# Patient Record
Sex: FEMALE | Race: WHITE
[De-identification: names, ages, dates, MRNs, and addresses within clinical notes are randomized per-mention and may not be internally consistent; named-entity substitution may affect disease eponyms.]

---

## 2018-03-18 ENCOUNTER — HOSPITAL ENCOUNTER (INPATIENT)
Dept: HOSPITAL 5 - TRG | Age: 33
LOS: 2 days | Discharge: HOME | End: 2018-03-20
Attending: OBSTETRICS & GYNECOLOGY | Admitting: OBSTETRICS & GYNECOLOGY
Payer: MEDICAID

## 2018-03-18 DIAGNOSIS — Z3A.38: ICD-10-CM

## 2018-03-18 DIAGNOSIS — D64.9: ICD-10-CM

## 2018-03-18 DIAGNOSIS — J45.909: ICD-10-CM

## 2018-03-18 LAB
HCT VFR BLD CALC: 37.7 % (ref 30.3–42.9)
HGB BLD-MCNC: 12.2 GM/DL (ref 10.1–14.3)
MCH RBC QN AUTO: 29 PG (ref 28–32)
MCHC RBC AUTO-ENTMCNC: 33 % (ref 30–34)
MCV RBC AUTO: 88 FL (ref 79–97)
PLATELET # BLD: 183 K/MM3 (ref 140–440)
RBC # BLD AUTO: 4.3 M/MM3 (ref 3.65–5.03)

## 2018-03-18 PROCEDURE — 00HU33Z INSERTION OF INFUSION DEVICE INTO SPINAL CANAL, PERCUTANEOUS APPROACH: ICD-10-PCS

## 2018-03-18 PROCEDURE — 86901 BLOOD TYPING SEROLOGIC RH(D): CPT

## 2018-03-18 PROCEDURE — 85014 HEMATOCRIT: CPT

## 2018-03-18 PROCEDURE — 85027 COMPLETE CBC AUTOMATED: CPT

## 2018-03-18 PROCEDURE — 86850 RBC ANTIBODY SCREEN: CPT

## 2018-03-18 PROCEDURE — 90686 IIV4 VACC NO PRSV 0.5 ML IM: CPT

## 2018-03-18 PROCEDURE — G0008 ADMIN INFLUENZA VIRUS VAC: HCPCS

## 2018-03-18 PROCEDURE — 99211 OFF/OP EST MAY X REQ PHY/QHP: CPT

## 2018-03-18 PROCEDURE — 59025 FETAL NON-STRESS TEST: CPT

## 2018-03-18 PROCEDURE — 86900 BLOOD TYPING SEROLOGIC ABO: CPT

## 2018-03-18 PROCEDURE — G0463 HOSPITAL OUTPT CLINIC VISIT: HCPCS

## 2018-03-18 PROCEDURE — A6250 SKIN SEAL PROTECT MOISTURIZR: HCPCS

## 2018-03-18 PROCEDURE — 85018 HEMOGLOBIN: CPT

## 2018-03-18 PROCEDURE — 90715 TDAP VACCINE 7 YRS/> IM: CPT

## 2018-03-18 PROCEDURE — 90471 IMMUNIZATION ADMIN: CPT

## 2018-03-18 PROCEDURE — 3E0R3BZ INTRODUCTION OF ANESTHETIC AGENT INTO SPINAL CANAL, PERCUTANEOUS APPROACH: ICD-10-PCS

## 2018-03-18 PROCEDURE — 36415 COLL VENOUS BLD VENIPUNCTURE: CPT

## 2018-03-18 NOTE — HISTORY AND PHYSICAL REPORT
History of Present Illness


Date of examination: 18


Date of admission: 


18 11:20





Chief complaint: 


contractions





History of present illness: 


31y/o  @ 38+2 weeks presents in active labor. She denies leakage of 

fluid. The patient transferred her care @ 31 weeks from [place]. She reports 

conceiving on an IUD. GBS negative. 








Past History


Past Medical History: no pertinent history


Past Surgical History: no surgical history


GYN History: hepatitis C


Social history: 





- Obstetrical History


Expected Date of Delivery: 18


Actual Gestation: 38 Week(s) 2 Day(s) 


: 6


Para: 4


Hx # Term Pregnancies: 4


Number of  Pregnancies: 0


Spontaneous Abortions: 1


Induced : 0


Number of Living Children: 4





Medications and Allergies


 Allergies











Allergy/AdvReac Type Severity Reaction Status Date / Time


 


No Known Allergies Allergy   Verified 18 07:35











 Home Medications











 Medication  Instructions  Recorded  Confirmed  Last Taken  Type


 


Prenatal Vits96/Iron Fum/Folic 1 tab PO DAILY 14 09:00 

History





[Prenatal Tablet]    1 











Active Meds: 


Active Medications





Influenza Virus Vaccine Quadrival (Fluarix Quad [id number](36 Mos+)  0.5 ml IM 

.ONCE ONE


   Stop: 18 12:01











Review of Systems


All systems: negative


Genitourinary: pelvic pain, contractions





- Vital Signs


Vital signs: 


 Vital Signs











Pulse BP


 


 105 H  111/68 


 


 18 07:58  18 07:58








 











Temp Pulse Resp BP Pulse Ox


 


 98.4 F   105 H  18   111/68    


 


 18 11:32  18 07:58  18 11:32  18 07:58   














- Physical Exam


Breasts: Positive: deferred


Cardiovascular: Regular rate


Lungs: Positive: Clear to auscultation


Abdomen: Positive: normal appearance





Results


All other labs normal.








Assessment and Plan





- Patient Problems


(1) Active labor at term


Current Visit: No   Status: Acute   


Plan to address problem: 


admit to L&D

## 2018-03-18 NOTE — PROCEDURE NOTE
OB Delivery Note





- Delivery


Date of Delivery: 18


Surgeon: FRED SHIPLEY


Estimated blood loss: 100cc





- Vaginal


Delivery presentation: vertex


Delivery position: OA


Intrapartum events: none


Delivery augmentation: pitocin


Delivery monitor: external FHT


Route of delivery: 


Delivery placenta: spontaneous


Delivery cord: nuchal cord, 3 umbilical vessels


Episiotomy: none


Delivery laceration: none


Anesthesia: epidural


Delivery comments: 


Patient progressed to C/C/+1 and pushed to deliver a liveborn male infant with 

apgars of 8/9 and weight of 8lbs 8oz. After delivery of the head a nuchal cord 

x 1 was manually reduced. The anterior shoulder delivered. The infant was bulb 

suctioned and placed on the patient's abdomen after the cord was clamped and 

cut. The placenta delivered spontaneous intact with a 3VC. No lacerations. EBL 

100ml








- Infant


  ** A


Apgar at 1 minute: 8 (weight 8lbs 8oz)


Apgar at 5 minutes: 9


Infant Gender: Male (weight)

## 2018-03-19 LAB
HCT VFR BLD CALC: 33.8 % (ref 30.3–42.9)
HGB BLD-MCNC: 11 GM/DL (ref 10.1–14.3)

## 2018-03-19 RX ADMIN — IBUPROFEN SCH MG: 600 TABLET, FILM COATED ORAL at 18:17

## 2018-03-19 RX ADMIN — IBUPROFEN SCH MG: 600 TABLET, FILM COATED ORAL at 05:47

## 2018-03-19 RX ADMIN — IBUPROFEN SCH: 600 TABLET, FILM COATED ORAL at 22:00

## 2018-03-19 RX ADMIN — HYDROCODONE BITARTRATE AND ACETAMINOPHEN PRN EACH: 5; 325 TABLET ORAL at 14:09

## 2018-03-19 RX ADMIN — IBUPROFEN SCH MG: 600 TABLET, FILM COATED ORAL at 00:18

## 2018-03-19 RX ADMIN — HYDROCODONE BITARTRATE AND ACETAMINOPHEN PRN EACH: 5; 325 TABLET ORAL at 22:41

## 2018-03-19 RX ADMIN — IBUPROFEN SCH MG: 600 TABLET, FILM COATED ORAL at 12:05

## 2018-03-19 RX ADMIN — HYDROCODONE BITARTRATE AND ACETAMINOPHEN PRN EACH: 5; 325 TABLET ORAL at 08:17

## 2018-03-19 NOTE — DISCHARGE SUMMARY
Providers





- Providers


Date of Admission: 


18 11:20





Date of discharge: 18


Attending physician: 


FRED SHIPLEY





Primary care physician: 


FRED SHIPLEY








Hospitalization


Reason for admission: active labor


Delivery: 


Episiotomy: none


Laceration: none


Other postpartum procedures: none


Postpartum complications: none


[place] baby: male


Condition at discharge: Good


Disposition: DC-01 TO HOME OR SELFCARE





Plan





- Provider Discharge Summary


Activity: routine, no sex for 6 weeks, no strenuous exercise


Diet: routine


Instructions: routine


Additional instructions: 


[]  Smoking cessation referral if applicable(refer to patient education folder 

for contact #)


[]  Refer to [facility]'s [facility] Booklet








Call your doctor immediately for:


* Fever > 100.5


* Heavy vaginal bleeding ( >1 pad per hour)


* Severe persistent headache


* Shortness of breath


* Reddened, hot, painful area to leg or breast


* Drainage or odor from incision.





* Keep incision clean and dry at all times and follow doctor's instructions 

regarding bathing/showering











- Follow up plan


Follow up: 


FRED SHIPLEY MD [Primary Care Provider] - 18

## 2018-03-19 NOTE — PROGRESS NOTE
Assessment and Plan





A/P PPD#1  


      doing well


      VSS


      desires tubal on outpatient


      h/h 12.2/37.7--11/33.8


      O+ no rhogam indicated 





Subjective





- Subjective


Date of service: 18


Principal diagnosis: s/p 


Patient reports: appetite normal, voiding normally, pain well controlled, flatus

, ambulating normally


: doing well, nursing well, bottle feeding





Objective





- Vital Signs


Latest vital signs: 


 Vital Signs











  Temp Pulse Resp BP BP Pulse Ox


 


 18 08:17    20   


 


 18 06:47    18   


 


 18 05:47    20   


 


 18 04:15  98.6 F  72  16   117/72 


 


 18 01:18    16   


 


 18 00:18    18   


 


 18 00:00  98.6 F  89  16   100/69 


 


 18 20:00  98.6 F  79  16   114/78 


 


 18 19:04  98.8 F  87  18   119/75 


 


 18 16:59   86   111/70  


 


 18 16:44   111 H   104/62  


 


 18 16:29   90   106/57  


 


 18 16:14   98 H   108/64  


 


 18 15:59   99 H   103/59  


 


 18 15:44   107 H   112/67  


 


 18 15:31   150 H   102/63  


 


 18 15:15   98 H   112/68  


 


 18 15:12   110 H     93


 


 18 15:11   99 H     100


 


 18 15:06   103 H     100


 


 18 15:01   90     100


 


 18 14:59   81   109/64  


 


 18 14:56   94 H     100


 


 18 14:51   84     100


 


 18 14:46   91 H     100


 


 18 14:44   91 H   112/60  


 


 18 14:41   94 H     100


 


 18 14:36   102 H     100


 


 18 14:31   95 H     100


 


 18 14:29   92 H   112/68  


 


 18 14:26   91 H     100


 


 18 14:21   90     100


 


 18 14:16   95 H     100


 


 03/18/18 14:14   85   109/68  


 


 03/18/18 14:11   98 H     99


 


 03/18/18 14:06   95 H     98


 


 03/18/18 14:01   108 H     98


 


 03/18/18 13:59   108 H   103/73  


 


 03/18/18 13:56   85     99


 


 03/18/18 13:51   118 H     98


 


 03/18/18 13:46   116 H     98


 


 03/18/18 13:44   109 H   104/57  


 


 03/18/18 13:41   107 H     99


 


 03/18/18 13:36   102 H     97


 


 03/18/18 13:31   102 H     97


 


 03/18/18 13:29   102 H   108/62  


 


 03/18/18 13:26   86     97


 


 03/18/18 13:21   96 H     98


 


 03/18/18 13:16   100 H   97/62   98


 


 03/18/18 13:11   102 H     98


 


 03/18/18 13:06   99 H     98


 


 03/18/18 13:01   97 H     98


 


 03/18/18 12:58   109 H   121/62  


 


 03/18/18 12:56   107 H   120/60   98


 


 03/18/18 12:54   110 H   125/67  


 


 03/18/18 12:52   110 H   122/70  


 


 03/18/18 12:51   102 H     98


 


 03/18/18 12:50   106 H   124/68  


 


 03/18/18 12:48   106 H   124/73  


 


 03/18/18 12:46   107 H   116/66   98


 


 03/18/18 12:44   117 H   111/64  


 


 03/18/18 12:42   105 H   116/67  


 


 03/18/18 12:41   102 H     98


 


 03/18/18 12:40   104 H   120/71  


 


 03/18/18 12:38   108 H   119/72  


 


 03/18/18 12:36   96 H   126/79   99


 


 03/18/18 12:34   100 H   123/76  


 


 03/18/18 12:32   105 H   135/75  


 


 03/18/18 12:31   110 H     98


 


 03/18/18 12:15   110 H   121/64  


 


 03/18/18 12:01   103 H   120/59  


 


 03/18/18 11:47   96 H   106/61  


 


 03/18/18 11:32  98.4 F   18   








 Intake and Output











 03/18/18 03/19/18 03/19/18





 23:59 07:59 15:59


 


Intake Total 300 200 


 


Output Total 700  


 


Balance -400 200 


 


Intake:   


 


  Oral  200 


 


  Intake, Free Water 300  


 


Output:   


 


  Urine 700  


 


    Void 700  


 


Other:   


 


  Total, Intake Amount  200 


 


  Total, Output Amount 200  


 


  # Voids   


 


    Void 1  














- Exam


Breasts: Present: normal


Cardiovascular: Present: Regular rate, Normal S1


Lungs: Present: Clear to auscultation


Abdomen: Present: normal appearance, soft, normal bowel sounds.  Absent: 

distention, tenderness, guarding


Vulva: both: normal


Uterus: Present: normal, firm, fundal height below umbilicus.  Absent: bogginess

, tenderness


Extremities: Present: normal


Deep Tendon Reflex Grade: Normal +2





- Labs


Labs: 


 Abnormal lab results











  18 Range/Units





  11:00 


 


WBC  13.4 H  (4.5-11.0)  K/mm3


 


RDW  17.8 H  (13.2-15.2)  %

## 2018-03-20 VITALS — DIASTOLIC BLOOD PRESSURE: 62 MMHG | SYSTOLIC BLOOD PRESSURE: 104 MMHG

## 2018-03-20 RX ADMIN — IBUPROFEN SCH MG: 600 TABLET, FILM COATED ORAL at 06:28

## 2018-03-20 RX ADMIN — IBUPROFEN SCH MG: 600 TABLET, FILM COATED ORAL at 00:09

## 2018-05-02 ENCOUNTER — HOSPITAL ENCOUNTER (OUTPATIENT)
Dept: HOSPITAL 5 - OR | Age: 33
Discharge: HOME | End: 2018-05-02
Attending: OBSTETRICS & GYNECOLOGY
Payer: MEDICAID

## 2018-05-02 VITALS — DIASTOLIC BLOOD PRESSURE: 67 MMHG | SYSTOLIC BLOOD PRESSURE: 121 MMHG

## 2018-05-02 DIAGNOSIS — Z30.2: Primary | ICD-10-CM

## 2018-05-02 DIAGNOSIS — K21.9: ICD-10-CM

## 2018-05-02 DIAGNOSIS — J45.909: ICD-10-CM

## 2018-05-02 DIAGNOSIS — B19.20: ICD-10-CM

## 2018-05-02 DIAGNOSIS — N83.01: ICD-10-CM

## 2018-05-02 PROCEDURE — 88112 CYTOPATH CELL ENHANCE TECH: CPT

## 2018-05-02 PROCEDURE — 58671 LAPAROSCOPY TUBAL BLOCK: CPT

## 2018-05-02 PROCEDURE — 81025 URINE PREGNANCY TEST: CPT

## 2018-05-02 RX ADMIN — HYDROMORPHONE HYDROCHLORIDE PRN MG: 1 INJECTION, SOLUTION INTRAMUSCULAR; INTRAVENOUS; SUBCUTANEOUS at 08:45

## 2018-05-02 RX ADMIN — HYDROMORPHONE HYDROCHLORIDE PRN MG: 1 INJECTION, SOLUTION INTRAMUSCULAR; INTRAVENOUS; SUBCUTANEOUS at 08:55

## 2018-05-02 NOTE — SHORT STAY SUMMARY
Short Stay Documentation


Date of service: 18


Narrative H&P: 


34y/o  with undesired fertility. She has been counseled on other 

contraceptive options and has elected for permanent sterilization. 








- History


Principal diagnosis: Undesired fertility


Past Medical History: other (Hepatitis C)


Past Surgical History: No surgical history


Social history: 





- Allergies and Medications


Current Medications: 


 Allergies





No Known Allergies Allergy (Verified 18 15:36)


 





 Home Medications











 Medication  Instructions  Recorded  Confirmed  Last Taken  Type


 


No Known Home Medications [No  18 Unknown History





Reported Home Medications]     








Active Medications





Hydromorphone HCl (Dilaudid)  0.5 mg IV Q10MIN PRN


   PRN Reason: Pain , Severe (7-10)


Lactated Ringer's (Lactated Ringers)  1,000 mls @ 100 mls/hr IV AS DIRECT LEONEL


   Last Admin: 18 06:15 Dose:  100 mls/hr


Midazolam HCl (Versed)  2 mg IV PREOP NR


   Stop: 18 23:59











- Physical exam


General appearance: no acute distress


Integumentary: no rash


HEENT: Atraumatic


Lungs: Clear to auscultation


Breasts: deferred


Heart: Regular rate


Gastrointestinal: normal


Female Genitourinary: deferred


Rectal Exam: deferred





- Brief post op/procedure progress note


Date of procedure: 18


Pre-op diagnosis: undesired fertility


Post-op diagnosis: same


Procedure: 


Laparoscopic bilateral tubal ligation


Evacuation of ovarian cyst fluid





Anesthesia: GETA


Surgeon: FRED SHIPLEY


Estimated blood loss: none


Pathology: list (ovarian cyst fluid)


Specimen disposition: to lab


Condition: stable





- Hospital course


Hospital course: 


The patient was admitted the day of surgery and underwent a bilateral tubal 

ligation.  Please see operative note for details of surgery.  Postoperative 

course was uneventful.








- Disposition


Condition at discharge: Good


Disposition: DC-01 TO HOME OR SELFCARE





Short Stay Discharge Plan


Activity: other (pelvic rest for 1 week)


Diet: regular


Additional Instructions: 


Follow-up is not required


Follow-up as needed


Prescriptions: 


Ibuprofen [Motrin] 800 mg PO Q8HR PRN #60 tablet


 PRN Reason: Pain


oxyCODONE /ACETAMINOPHEN [Percocet 5/325] 1 tab PO Q6HR PRN #30 tablet


 PRN Reason: Pain

## 2018-05-02 NOTE — ANESTHESIA CONSULTATION
Anesthesia Consult and Med Hx


Date of service: 05/02/18





- Airway


Anesthetic Teeth Evaluation: Good


ROM Head & Neck: Adequate


Mental/Hyoid Distance: Adequate (3FB)


Mallampati Class: Class II


Intubation Access Assessment: Probably Good





- Pulmonary Exam


CTA: Yes





- Cardiac Exam


Cardiac Exam: RRR (grade 1 murmur)





- Pre-Operative Health Status


ASA Pre-Surgery Classification: ASA2


Proposed Anesthetic Plan: General





- Pulmonary


Hx Smoking: No


Hx Asthma: Yes (Not treated - not recently active)


COPD: No


Hx Pneumonia: No





- Cardiovascular System


Hx Hypertension: No


Hx Heart Murmur: Yes





- Central Nervous System


Hx Seizures: No


Hx Psychiatric Problems: No





- Gastrointestinal


Hx Gastroesophageal Reflux Disease: No (periodic heartburn treated with OTC 

Prevacid PRN)





- Endocrine


Hx Hypothyroidism: No


Hx Hyperthyroidism: No





- Hematic


Hx Anemia: Yes


Hx Sickle Cell Disease: No





- Other Systems


Hx Alcohol Use: No


Hx Cancer: No

## 2018-05-02 NOTE — OPERATIVE REPORT
Operative Report


Operative Report: 


Date of surgery: 2018





Preoperative diagnosis: Unwanted fertility





Postoperative diagnosis: Same as above





Procedure: Laparoscopic bilateral tubal ligation with Filshie clips; aspiration 

of ovarian cyst





Surgeon: Jada León M.D.





Anesthesia: General endotracheal anesthesia





Estimated blood loss: Minimal





Findings: Normal uterus and tubes bilaterally.  Right simple ovarian cyst and 

normal left ovary





Indication: 33-year-old  015 with undesired fertility.  The patient 

elected for permanent sterilization.





Procedure: The patient was taken to the operating room and given general 

endotracheal anesthesia without complication.  The patient is prepped and 

draped in a normal sterile fashion.  A bivalve speculum was placed in the 

patient's vagina and a single-tooth tenaculum was placed on  the anterior lip 

of the cervix .A uterine acorn manipulato rwas placed,  and the bivalve 

speculum was then removed. Attention was then turned to the patient's abdomen 

where a 5 mm infraumbilical skin incision was then made.  A Veress needle was 

placed and peritoneal entry was verified water-filled syringe.  Insufflation of 

the peritoneal cavity was performed with CO2 gas.  A 5 mm trocar was placed and 

the laparoscope was then inserted.  The patient was then placed in 

Trendelenburg.  A 7 mm suprapubic skin incision was then made.  Under direct 

visualization a 7 mm trocar was then placed.  General survey of the patient's 

abdomen revealedefault value. The fallopian tube was then followed out to the 

fimbriated end.  A Filshie clip was placed, on the ampullary portion of the 

tube.  This was performed on the contralateral side as well.  There were 

findings of a right ovarian simple cyst.  A needle was injected into the 

ovarian cyst with aspiration of clear jose fluid.  The fluid was sent to 

pathology.  The 7 mm trocar was then removed.  The pneumoperitoneum was then 

released.  The 5 mm trocar laparoscope was then removed.  The skin incisions 

were then closed with 4-0 Monocryl.  The incisions were injected with quarter 

percent Marcaine.  Dressings were applied to the incision.  The vaginal 

instruments were then removed atraumatically.  Then successfully extubated and 

taken to the recovery room.  All sponge laps and needle counts were correct 2.